# Patient Record
Sex: FEMALE | Race: WHITE | NOT HISPANIC OR LATINO | URBAN - NONMETROPOLITAN AREA
[De-identification: names, ages, dates, MRNs, and addresses within clinical notes are randomized per-mention and may not be internally consistent; named-entity substitution may affect disease eponyms.]

---

## 2019-05-22 ENCOUNTER — IMPORTED ENCOUNTER (OUTPATIENT)
Dept: URBAN - NONMETROPOLITAN AREA CLINIC 1 | Facility: CLINIC | Age: 79
End: 2019-05-22

## 2019-05-22 PROBLEM — H35.3131: Noted: 2019-05-22

## 2019-05-22 PROBLEM — E11.9: Noted: 2019-05-22

## 2019-05-22 PROBLEM — H47.11: Noted: 2019-05-22

## 2019-05-22 PROBLEM — Z96.1: Noted: 2019-05-22

## 2019-05-22 PROCEDURE — 92014 COMPRE OPH EXAM EST PT 1/>: CPT

## 2019-05-22 NOTE — PATIENT DISCUSSION
DM s DR-Stressed the importance of keeping blood sugars under control blood pressure under control and weight normalization and regular visits with PCP. -Explained the possible effects of poorly controlled diabetes and the damage that diabetes can cause to ocular health. -Patient to check HgbA1C.-Pt instructed to contact our office with any vision changes. PCIOL w open cap OUARMD OU- minLow risk for VA lossNo AREDS2 indicated Continue Amsler Grid x 1 weekBilateral ON edema Consult w/ neuro order OCT ONH Today N/A Vf 24-2 w/ fundus photos N/A F/u w/ GS ( next appt) request image of  CT Scans/ MRI of brain RTC N/A VF 24-2 w/ Fundus Photos and N/A w/ GS

## 2019-05-23 ENCOUNTER — IMPORTED ENCOUNTER (OUTPATIENT)
Dept: URBAN - NONMETROPOLITAN AREA CLINIC 1 | Facility: CLINIC | Age: 79
End: 2019-05-23

## 2019-05-23 PROCEDURE — 92250 FUNDUS PHOTOGRAPHY W/I&R: CPT

## 2019-05-23 PROCEDURE — 92083 EXTENDED VISUAL FIELD XM: CPT

## 2019-05-23 NOTE — PATIENT DISCUSSION
VF 24-2 and fundus photos done today 5/23/19. -TETO leos DR-Stressed the importance of keeping blood sugars under control blood pressure under control and weight normalization and regular visits with PCP. -Explained the possible effects of poorly controlled diabetes and the damage that diabetes can cause to ocular health. -Patient to check HgbA1C.-Pt instructed to contact our office with any vision changes. PCIOL w open cap OUARMD OU- minLow risk for VA lossNo AREDS2 indicated Continue Amsler Grid x 1 weekBilateral ON edema Consult w/ neuro order OCT ONH Today N/A Vf 24-2 w/ fundus photos N/A F/u w/ GS ( next appt) request image of  CT Scans/ MRI of brain RTC N/A VF 24-2 w/ Fundus Photos and N/A w/ GS

## 2019-05-29 ENCOUNTER — IMPORTED ENCOUNTER (OUTPATIENT)
Dept: URBAN - NONMETROPOLITAN AREA CLINIC 1 | Facility: CLINIC | Age: 79
End: 2019-05-29

## 2019-05-29 PROCEDURE — 92133 CPTRZD OPH DX IMG PST SGM ON: CPT

## 2019-05-29 PROCEDURE — 99212 OFFICE O/P EST SF 10 MIN: CPT

## 2019-05-29 NOTE — PATIENT DISCUSSION
Bilateral ON edema OU Repeat OCT ONH today to compare from last OCT [facility] 5/22/19 stable OU VF shows no neuro defects OU Obtain compies of blood work from PCP ESR and CRP  most important. Daughter is going to bring in copy of blood work. Explained w/ daughters and patient. - Consider temp aryt. biposy depending on blood test.  - Pt states that has constant headaches- If anything happens w/ vision please contact office ASAP- Recommend to f/u ASAP w/ PCP Dr. Jerlyn Harada. DM s DR-Stressed the importance of keeping blood sugars under control blood pressure under control and weight normalization and regular visits with PCP. -Explained the possible effects of poorly controlled diabetes and the damage that diabetes can cause to ocular health. -Patient to check HgbA1C.-Pt instructed to contact our office with any vision changes. PCIOL w open cap OUARMD OU- minLow risk for VA lossNo AREDS2 indicated Continue Amsler Grid x 1 weekRTC

## 2019-07-08 ENCOUNTER — IMPORTED ENCOUNTER (OUTPATIENT)
Dept: URBAN - NONMETROPOLITAN AREA CLINIC 1 | Facility: CLINIC | Age: 79
End: 2019-07-08

## 2019-07-08 PROBLEM — E11.9: Noted: 2019-07-08

## 2019-07-08 PROBLEM — Z96.1: Noted: 2019-07-08

## 2019-07-08 PROBLEM — H35.3131: Noted: 2019-07-08

## 2019-07-08 PROCEDURE — 92014 COMPRE OPH EXAM EST PT 1/>: CPT

## 2020-01-13 ENCOUNTER — IMPORTED ENCOUNTER (OUTPATIENT)
Dept: URBAN - NONMETROPOLITAN AREA CLINIC 1 | Facility: CLINIC | Age: 80
End: 2020-01-13

## 2020-01-13 PROCEDURE — 92014 COMPRE OPH EXAM EST PT 1/>: CPT

## 2020-01-13 PROCEDURE — 92134 CPTRZ OPH DX IMG PST SGM RTA: CPT

## 2020-01-13 NOTE — PATIENT DISCUSSION
DM s DR-Stressed the importance of keeping blood sugars under control blood pressure under control and weight normalization and regular visits with PCP. -Explained the possible effects of poorly controlled diabetes and the damage that diabetes can cause to ocular health. -Patient to check HgbA1C.-Pt instructed to contact our office with any vision changes. PCIOL w open cap OUmonitorARMD OU- minLow risk for VA lossNo AREDS2 indicated Continue Amsler Grid x 1 weekoct today/stable ou

## 2020-10-09 ENCOUNTER — IMPORTED ENCOUNTER (OUTPATIENT)
Dept: URBAN - NONMETROPOLITAN AREA CLINIC 1 | Facility: CLINIC | Age: 80
End: 2020-10-09

## 2020-10-09 PROBLEM — H34.8120: Noted: 2020-10-09

## 2020-10-09 PROCEDURE — 92014 COMPRE OPH EXAM EST PT 1/>: CPT

## 2020-10-09 NOTE — PATIENT DISCUSSION
CRVO OS WITH MAC EDEMAEDUCATE PTON POSSIBLE NEED TO TX WITH RETINAL AND POSSIBLE BLOOD WORKUP FOR GCAREFER TO DR. [place] 83 AND [place]

## 2020-11-18 ENCOUNTER — IMPORTED ENCOUNTER (OUTPATIENT)
Dept: URBAN - NONMETROPOLITAN AREA CLINIC 1 | Facility: CLINIC | Age: 80
End: 2020-11-18

## 2020-11-18 PROCEDURE — 92083 EXTENDED VISUAL FIELD XM: CPT

## 2021-03-04 ENCOUNTER — IMPORTED ENCOUNTER (OUTPATIENT)
Dept: URBAN - NONMETROPOLITAN AREA CLINIC 1 | Facility: CLINIC | Age: 81
End: 2021-03-04

## 2021-03-04 PROBLEM — H35.363: Noted: 2021-03-04

## 2021-03-04 PROBLEM — H34.8120: Noted: 2020-10-09

## 2021-03-04 PROCEDURE — 92014 COMPRE OPH EXAM EST PT 1/>: CPT

## 2021-03-04 PROCEDURE — 92134 CPTRZ OPH DX IMG PST SGM RTA: CPT

## 2021-08-10 ENCOUNTER — IMPORTED ENCOUNTER (OUTPATIENT)
Dept: URBAN - NONMETROPOLITAN AREA CLINIC 1 | Facility: CLINIC | Age: 81
End: 2021-08-10

## 2021-08-10 PROBLEM — H35.363: Noted: 2021-08-10

## 2021-08-10 PROBLEM — H34.8120: Status: STABILIZING | Noted: 2021-08-10

## 2021-08-10 PROBLEM — H34.8120: Noted: 2021-08-10

## 2021-08-10 PROBLEM — H46.9: Noted: 2021-08-10

## 2021-08-10 PROCEDURE — 92083 EXTENDED VISUAL FIELD XM: CPT

## 2021-11-04 ENCOUNTER — IMPORTED ENCOUNTER (OUTPATIENT)
Dept: URBAN - NONMETROPOLITAN AREA CLINIC 1 | Facility: CLINIC | Age: 81
End: 2021-11-04

## 2021-11-04 PROCEDURE — 92083 EXTENDED VISUAL FIELD XM: CPT

## 2021-11-04 NOTE — PATIENT DISCUSSION
11/4/21 VF 30-2 Ordered by Dr Wanda Reyes with tech only JLS/P CRVO OS [facility] APPT. MAC DRUSEN OUSTABLE TODAYOCT TODAY STABLE OU

## 2022-04-10 ASSESSMENT — VISUAL ACUITY
OD_SC: 20/40
OS_CC: 20/40-1
OD_SC: 20/30
OS_SC: 20/30-1
OS_CC: 20/50+2
OD_CC: 20/40-2
OS_CC: J1
OD_SC: 20/25
OD_CC: 20/40-2
OS_SC: CF2'
OS_SC: 20/30
OU_CC: 20/20
OD_CC: J1
OD_SC: 20/50

## 2022-04-10 ASSESSMENT — TONOMETRY
OS_IOP_MMHG: 11
OD_IOP_MMHG: 11
OS_IOP_MMHG: 15
OD_IOP_MMHG: 15
OD_IOP_MMHG: 16
OS_IOP_MMHG: 14
OS_IOP_MMHG: 11
OD_IOP_MMHG: 11
OD_IOP_MMHG: 13
OS_IOP_MMHG: 18

## 2023-06-08 ENCOUNTER — EMERGENCY VISIT (OUTPATIENT)
Dept: RURAL CLINIC 1 | Facility: CLINIC | Age: 83
End: 2023-06-08

## 2023-06-08 DIAGNOSIS — H00.11: ICD-10-CM

## 2023-06-08 DIAGNOSIS — H00.14: ICD-10-CM

## 2023-06-08 PROCEDURE — 99214 OFFICE O/P EST MOD 30 MIN: CPT

## 2023-06-08 ASSESSMENT — TONOMETRY
OS_IOP_MMHG: 14
OD_IOP_MMHG: 14

## 2023-06-08 ASSESSMENT — VISUAL ACUITY
OS_CC: CF 1FT
OD_CC: 20/40
OU_CC: 20/40
OD_CC: 20/50-1
OU_CC: 20/40-1
OS_CC: 20/40

## 2023-06-22 ENCOUNTER — CLINIC PROCEDURE ONLY (OUTPATIENT)
Dept: RURAL CLINIC 1 | Facility: CLINIC | Age: 83
End: 2023-06-22

## 2023-06-22 DIAGNOSIS — H00.15: ICD-10-CM

## 2023-06-22 PROCEDURE — 67800 REMOVE EYELID LESION: CPT

## 2023-06-30 ENCOUNTER — POST-OP (OUTPATIENT)
Dept: RURAL CLINIC 1 | Facility: CLINIC | Age: 83
End: 2023-06-30

## 2023-06-30 DIAGNOSIS — Z98.890: ICD-10-CM

## 2023-06-30 PROCEDURE — 99024 POSTOP FOLLOW-UP VISIT: CPT

## 2023-06-30 ASSESSMENT — TONOMETRY
OS_IOP_MMHG: 14
OD_IOP_MMHG: 14

## 2023-06-30 ASSESSMENT — VISUAL ACUITY: OD_CC: 20/50

## 2024-02-27 ENCOUNTER — EMERGENCY VISIT (OUTPATIENT)
Dept: RURAL CLINIC 1 | Facility: CLINIC | Age: 84
End: 2024-02-27

## 2024-02-27 DIAGNOSIS — H16.223: ICD-10-CM

## 2024-02-27 PROCEDURE — 99213 OFFICE O/P EST LOW 20 MIN: CPT

## 2024-02-27 ASSESSMENT — VISUAL ACUITY: OD_CC: 20/50+2

## 2025-04-22 ENCOUNTER — COMPREHENSIVE EXAM (OUTPATIENT)
Age: 85
End: 2025-04-22

## 2025-04-22 DIAGNOSIS — H53.453: ICD-10-CM

## 2025-04-22 DIAGNOSIS — E11.9: ICD-10-CM

## 2025-04-22 DIAGNOSIS — H02.88A: ICD-10-CM

## 2025-04-22 DIAGNOSIS — H16.223: ICD-10-CM

## 2025-04-22 DIAGNOSIS — H02.88B: ICD-10-CM

## 2025-04-22 PROCEDURE — 92083 EXTENDED VISUAL FIELD XM: CPT

## 2025-04-22 PROCEDURE — 92014 COMPRE OPH EXAM EST PT 1/>: CPT
